# Patient Record
Sex: FEMALE | Race: OTHER | HISPANIC OR LATINO | URBAN - METROPOLITAN AREA
[De-identification: names, ages, dates, MRNs, and addresses within clinical notes are randomized per-mention and may not be internally consistent; named-entity substitution may affect disease eponyms.]

---

## 2022-09-08 ENCOUNTER — EMERGENCY (EMERGENCY)
Facility: HOSPITAL | Age: 77
LOS: 0 days | Discharge: HOME | End: 2022-09-09
Attending: EMERGENCY MEDICINE | Admitting: HOSPITALIST

## 2022-09-08 VITALS
HEART RATE: 96 BPM | RESPIRATION RATE: 20 BRPM | SYSTOLIC BLOOD PRESSURE: 115 MMHG | HEIGHT: 60 IN | WEIGHT: 149.91 LBS | DIASTOLIC BLOOD PRESSURE: 78 MMHG | OXYGEN SATURATION: 99 % | TEMPERATURE: 98 F

## 2022-09-08 DIAGNOSIS — R07.89 OTHER CHEST PAIN: ICD-10-CM

## 2022-09-08 DIAGNOSIS — I95.9 HYPOTENSION, UNSPECIFIED: ICD-10-CM

## 2022-09-08 DIAGNOSIS — I10 ESSENTIAL (PRIMARY) HYPERTENSION: ICD-10-CM

## 2022-09-08 DIAGNOSIS — M19.90 UNSPECIFIED OSTEOARTHRITIS, UNSPECIFIED SITE: ICD-10-CM

## 2022-09-08 DIAGNOSIS — Z20.822 CONTACT WITH AND (SUSPECTED) EXPOSURE TO COVID-19: ICD-10-CM

## 2022-09-08 DIAGNOSIS — E78.5 HYPERLIPIDEMIA, UNSPECIFIED: ICD-10-CM

## 2022-09-08 DIAGNOSIS — E11.9 TYPE 2 DIABETES MELLITUS WITHOUT COMPLICATIONS: ICD-10-CM

## 2022-09-08 LAB
ALBUMIN SERPL ELPH-MCNC: 3.2 G/DL — LOW (ref 3.3–5)
ALP SERPL-CCNC: 59 U/L — SIGNIFICANT CHANGE UP (ref 40–120)
ALT FLD-CCNC: 13 U/L — SIGNIFICANT CHANGE UP (ref 12–78)
ANION GAP SERPL CALC-SCNC: 9 MMOL/L — SIGNIFICANT CHANGE UP (ref 5–17)
APAP SERPL-MCNC: 17 UG/ML — SIGNIFICANT CHANGE UP (ref 10–30)
APTT BLD: 29.5 SEC — SIGNIFICANT CHANGE UP (ref 27.5–35.5)
AST SERPL-CCNC: 19 U/L — SIGNIFICANT CHANGE UP (ref 15–37)
BASOPHILS # BLD AUTO: 0.02 K/UL — SIGNIFICANT CHANGE UP (ref 0–0.2)
BASOPHILS NFR BLD AUTO: 0.3 % — SIGNIFICANT CHANGE UP (ref 0–2)
BILIRUB SERPL-MCNC: 0.3 MG/DL — SIGNIFICANT CHANGE UP (ref 0.2–1.2)
BLD GP AB SCN SERPL QL: SIGNIFICANT CHANGE UP
BUN SERPL-MCNC: 24 MG/DL — HIGH (ref 7–23)
CALCIUM SERPL-MCNC: 7.9 MG/DL — LOW (ref 8.5–10.1)
CHLORIDE SERPL-SCNC: 114 MMOL/L — HIGH (ref 96–108)
CO2 SERPL-SCNC: 18 MMOL/L — LOW (ref 22–31)
CREAT SERPL-MCNC: 1.42 MG/DL — HIGH (ref 0.5–1.3)
EGFR: 38 ML/MIN/1.73M2 — LOW
EOSINOPHIL # BLD AUTO: 0.04 K/UL — SIGNIFICANT CHANGE UP (ref 0–0.5)
EOSINOPHIL NFR BLD AUTO: 0.6 % — SIGNIFICANT CHANGE UP (ref 0–6)
FLUAV AG NPH QL: SIGNIFICANT CHANGE UP
FLUBV AG NPH QL: SIGNIFICANT CHANGE UP
GLUCOSE SERPL-MCNC: 222 MG/DL — HIGH (ref 70–99)
HCT VFR BLD CALC: 33.8 % — LOW (ref 34.5–45)
HGB BLD-MCNC: 10.5 G/DL — LOW (ref 11.5–15.5)
IMM GRANULOCYTES NFR BLD AUTO: 0.1 % — SIGNIFICANT CHANGE UP (ref 0–1.5)
INR BLD: 1.27 RATIO — HIGH (ref 0.88–1.16)
LACTATE SERPL-SCNC: 2 MMOL/L — SIGNIFICANT CHANGE UP (ref 0.7–2)
LYMPHOCYTES # BLD AUTO: 1.5 K/UL — SIGNIFICANT CHANGE UP (ref 1–3.3)
LYMPHOCYTES # BLD AUTO: 22.1 % — SIGNIFICANT CHANGE UP (ref 13–44)
MCHC RBC-ENTMCNC: 28.9 PG — SIGNIFICANT CHANGE UP (ref 27–34)
MCHC RBC-ENTMCNC: 31.1 G/DL — LOW (ref 32–36)
MCV RBC AUTO: 93.1 FL — SIGNIFICANT CHANGE UP (ref 80–100)
MONOCYTES # BLD AUTO: 0.33 K/UL — SIGNIFICANT CHANGE UP (ref 0–0.9)
MONOCYTES NFR BLD AUTO: 4.9 % — SIGNIFICANT CHANGE UP (ref 2–14)
NEUTROPHILS # BLD AUTO: 4.88 K/UL — SIGNIFICANT CHANGE UP (ref 1.8–7.4)
NEUTROPHILS NFR BLD AUTO: 72 % — SIGNIFICANT CHANGE UP (ref 43–77)
NRBC # BLD: 0 /100 WBCS — SIGNIFICANT CHANGE UP (ref 0–0)
PLATELET # BLD AUTO: 250 K/UL — SIGNIFICANT CHANGE UP (ref 150–400)
POTASSIUM SERPL-MCNC: 3.8 MMOL/L — SIGNIFICANT CHANGE UP (ref 3.5–5.3)
POTASSIUM SERPL-SCNC: 3.8 MMOL/L — SIGNIFICANT CHANGE UP (ref 3.5–5.3)
PROT SERPL-MCNC: 6.1 GM/DL — SIGNIFICANT CHANGE UP (ref 6–8.3)
PROTHROM AB SERPL-ACNC: 15.2 SEC — HIGH (ref 10.5–13.4)
RBC # BLD: 3.63 M/UL — LOW (ref 3.8–5.2)
RBC # FLD: 13.7 % — SIGNIFICANT CHANGE UP (ref 10.3–14.5)
SALICYLATES SERPL-MCNC: <1.7 MG/DL — LOW (ref 2.8–20)
SARS-COV-2 RNA SPEC QL NAA+PROBE: SIGNIFICANT CHANGE UP
SODIUM SERPL-SCNC: 141 MMOL/L — SIGNIFICANT CHANGE UP (ref 135–145)
TROPONIN I, HIGH SENSITIVITY RESULT: 14.8 NG/L — SIGNIFICANT CHANGE UP
TROPONIN I, HIGH SENSITIVITY RESULT: 4.6 NG/L — SIGNIFICANT CHANGE UP
WBC # BLD: 6.78 K/UL — SIGNIFICANT CHANGE UP (ref 3.8–10.5)
WBC # FLD AUTO: 6.78 K/UL — SIGNIFICANT CHANGE UP (ref 3.8–10.5)

## 2022-09-08 PROCEDURE — 93010 ELECTROCARDIOGRAM REPORT: CPT

## 2022-09-08 PROCEDURE — 71045 X-RAY EXAM CHEST 1 VIEW: CPT | Mod: 26

## 2022-09-08 PROCEDURE — 70450 CT HEAD/BRAIN W/O DYE: CPT | Mod: 26,MA

## 2022-09-08 PROCEDURE — 71275 CT ANGIOGRAPHY CHEST: CPT | Mod: 26,MA

## 2022-09-08 PROCEDURE — 74174 CTA ABD&PLVS W/CONTRAST: CPT | Mod: 26,MA

## 2022-09-08 PROCEDURE — 99285 EMERGENCY DEPT VISIT HI MDM: CPT

## 2022-09-08 RX ORDER — ONDANSETRON 8 MG/1
4 TABLET, FILM COATED ORAL ONCE
Refills: 0 | Status: COMPLETED | OUTPATIENT
Start: 2022-09-08 | End: 2022-09-08

## 2022-09-08 RX ORDER — EPINEPHRINE 0.3 MG/.3ML
1 INJECTION INTRAMUSCULAR; SUBCUTANEOUS ONCE
Refills: 0 | Status: COMPLETED | OUTPATIENT
Start: 2022-09-08 | End: 2022-09-08

## 2022-09-08 RX ORDER — NOREPINEPHRINE BITARTRATE/D5W 8 MG/250ML
0.05 PLASTIC BAG, INJECTION (ML) INTRAVENOUS
Qty: 8 | Refills: 0 | Status: DISCONTINUED | OUTPATIENT
Start: 2022-09-08 | End: 2022-09-09

## 2022-09-08 RX ORDER — SODIUM CHLORIDE 9 MG/ML
1000 INJECTION INTRAMUSCULAR; INTRAVENOUS; SUBCUTANEOUS ONCE
Refills: 0 | Status: COMPLETED | OUTPATIENT
Start: 2022-09-08 | End: 2022-09-08

## 2022-09-08 RX ORDER — PANTOPRAZOLE SODIUM 20 MG/1
40 TABLET, DELAYED RELEASE ORAL ONCE
Refills: 0 | Status: COMPLETED | OUTPATIENT
Start: 2022-09-08 | End: 2022-09-08

## 2022-09-08 RX ADMIN — Medication 0.05 MICROGRAM(S)/KG/MIN: at 23:41

## 2022-09-08 RX ADMIN — SODIUM CHLORIDE 1000 MILLILITER(S): 9 INJECTION INTRAMUSCULAR; INTRAVENOUS; SUBCUTANEOUS at 22:00

## 2022-09-08 RX ADMIN — SODIUM CHLORIDE 1000 MILLILITER(S): 9 INJECTION INTRAMUSCULAR; INTRAVENOUS; SUBCUTANEOUS at 20:55

## 2022-09-08 RX ADMIN — Medication 6.38 MICROGRAM(S)/KG/MIN: at 21:25

## 2022-09-08 RX ADMIN — PANTOPRAZOLE SODIUM 40 MILLIGRAM(S): 20 TABLET, DELAYED RELEASE ORAL at 22:43

## 2022-09-08 RX ADMIN — ONDANSETRON 4 MILLIGRAM(S): 8 TABLET, FILM COATED ORAL at 22:43

## 2022-09-08 RX ADMIN — SODIUM CHLORIDE 1000 MILLILITER(S): 9 INJECTION INTRAMUSCULAR; INTRAVENOUS; SUBCUTANEOUS at 22:50

## 2022-09-08 RX ADMIN — EPINEPHRINE 1 MILLIGRAM(S): 0.3 INJECTION INTRAMUSCULAR; SUBCUTANEOUS at 21:04

## 2022-09-08 NOTE — ED ADULT NURSE NOTE - OBJECTIVE STATEMENT
Received pt lying on stretcher lethargic and drowsy but responsive to verbal stimuli when name called. Pt diaphoretic with pale skin with MAP of 36. According to family at bedside, pt was home c/o itching, hives, mild urticaria, and mild SOB, EMS called and was given Benadryl en route to ER. IV noted to left hand placed by EMS. #20 IV forearm placed on ER and pt taken immediately to CT. Pt had one episode of emesis in CT, Zofran and Epi administered as ordered. Hx HTN, hyperlipidemia. Received pt lying on stretcher lethargic and drowsy but responsive to verbal stimuli when name called. Pt diaphoretic with pale skin with MAP of 36. According to family at bedside, pt was home c/o itching, hives, mild urticaria, and mild SOB, EMS called and pt was given Benadryl en route to ER. IV noted to left hand placed by EMS. #20 IV forearm placed in ER and pt taken immediately to CT. Pt had one episode of emesis in CT, Zofran and Epi administered as ordered. Hx HTN, hyperlipidemia.

## 2022-09-08 NOTE — ED PROVIDER NOTE - PHYSICAL EXAMINATION
Gen: Alert, marked distress, writhing  Head: NC, AT, EOMI, normal lids/conjunctiva  ENT: normal hearing, moist mucosa  Neck: +supple, no meningismus, JVD, +Trachea midline  Pulm: Bilateral BS, normal resp effort, no wheeze/stridor/retractions  CV: RRR, no M/R/G, +dist pulses  Abd: soft, NT/ND, Negative Fisk signs, +BS, no palpable masses  Mskel: no edema/erythema/cyanosis  Skin: no rash, warm/dry  Neuro: AAOx2, no apparent sensory/motor deficits, coordination intact

## 2022-09-08 NOTE — ED ADULT NURSE NOTE - CHIEF COMPLAINT QUOTE
BIBA,  allergic reaction.  per EMS, pt c/o itching, hives, mild urticaria, mild SOB, started after eating chicken nuggets.   EMS administered Benadryl 50mg IVP at 2025.  no tongue swelling noted, no difficulty swallowing/speaking.  [place] speaking, son in triage

## 2022-09-08 NOTE — ED ADULT TRIAGE NOTE - CHIEF COMPLAINT QUOTE
BIBA,  allergic reaction.  per EMS, pt c/o itching, hives, mild urticaria, mild SOB, started after eating chicken nuggets.   EMS administered Benadryl 50mg IVP at 2025.  no tongue swelling noted, no difficulty swallowing/speaking.  Greenlandic speaking, son in triage

## 2022-09-08 NOTE — ED PROVIDER NOTE - CLINICAL SUMMARY MEDICAL DECISION MAKING FREE TEXT BOX
Patient came in with chest pain going into her back, hives in field treated with benadryl.  VS stabilized, although patient had critical ER course, evaluated for dissection, was very ill appearing at one point with severe hypotension treated with fluids, push dose epi, levophed drip, source unclear as to whether she had severe anaphylaxis, vasovagal event.  Serial EKGs unremarkable, slight uptrend in troponin.  Dissection ruled out.  Patient does not show signs of sepsis.  ICU asked to evaluate, patient doing much better, no longer on pressors, however will need admission for observation, serial EKG/trops.  Patient is to be admitted to the hospital and the case was discussed with the admitting physician.  Any changes in plan, additional imaging/labs, and further work up will be at the discretion of the admitting physician. Per discussion with admitting physician, all necessary consults for admitted patients to be obtained by morning team unless patient requires emergent intervention or medical advice by specialist overnight.

## 2022-09-08 NOTE — ED PROVIDER NOTE - EKG ADDITIONAL QUESTION - PERFORMED INDEPENDENT VISUALIZATION
LM informing patient I spoke with the pharmacy and Trulicity is covered under his formulary, so he should not have a problem picking up med. Advised patient to take activated copay card to his pharmacy.    Yes

## 2022-09-08 NOTE — ED PROVIDER NOTE - OBJECTIVE STATEMENT
Pertinent PMH/PSH/FHx/SHx and Review of Systems contained within:  Patient presents to the ED for chest pain radiating to her back/neck.  Patient triaged as allergic reaction, son at bedside states that patient eats chicken nuggets all the time and has the rash on and off for months, he does not believe she is having an allergic reaction.  Patient is in severe distress, keeps stating pain in chest and back and not able to answer further questions.       Relevant PMHx/SHx/SOCHx/FAMH:  HTN, HLD, DM, arthritis  Patient family denies EtOH/tobacco/illicit substance use. Pertinent PMH/PSH/FHx/SHx and Review of Systems contained within:  Patient presents to the ED for chest pain radiating to her back/neck.  Patient triaged as allergic reaction, son at bedside states that patient eats chicken nuggets all the time and has the rash on and off for months, he does not believe she is having an allergic reaction.  Patient is in severe distress, keeps stating pain in chest and back and not able to answer further questions.   Patient is already declining admission, concern about TIAs d/w patient.     Relevant PMHx/SHx/SOCHx/FAMH:  HTN, HLD, DM, arthritis  Patient family denies EtOH/tobacco/illicit substance use.

## 2022-09-08 NOTE — ED ADULT TRIAGE NOTE - HISTORY OF COVID-19 VACCINATION
Date: 8/27/2021    Time: 1049 AM  Patient Placed On BIPAP/CPAP/ Non-Invasive Ventilation? Yes    If no must comment. Facial area red/color change? No           If YES are Blister/Lesion present? No   If yes must notify nursing staff  BIPAP/CPAP skin barrier?   Yes    Skin barrier type:mepilexlite       Comments:    bipap applied in ER     [facility] Yes

## 2022-09-08 NOTE — ED ADULT TRIAGE NOTE - HAVE YOU HAD A FIRST COVID-19 BOOSTER?
Render Risk Assessment In Note?: no Detail Level: Zone Recommendation Preamble: The following recommendations were made during the visit: Yes

## 2022-09-09 ENCOUNTER — TRANSCRIPTION ENCOUNTER (OUTPATIENT)
Age: 77
End: 2022-09-09

## 2022-09-09 VITALS
TEMPERATURE: 98 F | DIASTOLIC BLOOD PRESSURE: 72 MMHG | RESPIRATION RATE: 17 BRPM | OXYGEN SATURATION: 97 % | SYSTOLIC BLOOD PRESSURE: 115 MMHG | HEART RATE: 75 BPM

## 2022-09-09 DIAGNOSIS — T78.2XXA ANAPHYLACTIC SHOCK, UNSPECIFIED, INITIAL ENCOUNTER: ICD-10-CM

## 2022-09-09 DIAGNOSIS — Z91.89 OTHER SPECIFIED PERSONAL RISK FACTORS, NOT ELSEWHERE CLASSIFIED: ICD-10-CM

## 2022-09-09 DIAGNOSIS — D64.9 ANEMIA, UNSPECIFIED: ICD-10-CM

## 2022-09-09 DIAGNOSIS — N17.9 ACUTE KIDNEY FAILURE, UNSPECIFIED: ICD-10-CM

## 2022-09-09 LAB
A1C WITH ESTIMATED AVERAGE GLUCOSE RESULT: 5.9 % — HIGH (ref 4–5.6)
ALBUMIN SERPL ELPH-MCNC: 3.2 G/DL — LOW (ref 3.3–5)
ALP SERPL-CCNC: 60 U/L — SIGNIFICANT CHANGE UP (ref 40–120)
ALT FLD-CCNC: 14 U/L — SIGNIFICANT CHANGE UP (ref 12–78)
ANION GAP SERPL CALC-SCNC: 5 MMOL/L — SIGNIFICANT CHANGE UP (ref 5–17)
AST SERPL-CCNC: 23 U/L — SIGNIFICANT CHANGE UP (ref 15–37)
BILIRUB SERPL-MCNC: 0.5 MG/DL — SIGNIFICANT CHANGE UP (ref 0.2–1.2)
BUN SERPL-MCNC: 16 MG/DL — SIGNIFICANT CHANGE UP (ref 7–23)
CALCIUM SERPL-MCNC: 8.8 MG/DL — SIGNIFICANT CHANGE UP (ref 8.5–10.1)
CHLORIDE SERPL-SCNC: 114 MMOL/L — HIGH (ref 96–108)
CHOLEST SERPL-MCNC: 190 MG/DL — SIGNIFICANT CHANGE UP
CO2 SERPL-SCNC: 22 MMOL/L — SIGNIFICANT CHANGE UP (ref 22–31)
CREAT SERPL-MCNC: 0.74 MG/DL — SIGNIFICANT CHANGE UP (ref 0.5–1.3)
EGFR: 83 ML/MIN/1.73M2 — SIGNIFICANT CHANGE UP
ESTIMATED AVERAGE GLUCOSE: 123 MG/DL — HIGH (ref 68–114)
FERRITIN SERPL-MCNC: 42 NG/ML — SIGNIFICANT CHANGE UP (ref 15–150)
FOLATE SERPL-MCNC: 9.2 NG/ML — SIGNIFICANT CHANGE UP
GLUCOSE BLDC GLUCOMTR-MCNC: 103 MG/DL — HIGH (ref 70–99)
GLUCOSE BLDC GLUCOMTR-MCNC: 74 MG/DL — SIGNIFICANT CHANGE UP (ref 70–99)
GLUCOSE SERPL-MCNC: 83 MG/DL — SIGNIFICANT CHANGE UP (ref 70–99)
HCT VFR BLD CALC: 34.7 % — SIGNIFICANT CHANGE UP (ref 34.5–45)
HDLC SERPL-MCNC: 70 MG/DL — SIGNIFICANT CHANGE UP
HGB BLD-MCNC: 10.9 G/DL — LOW (ref 11.5–15.5)
IRON SATN MFR SERPL: 16 % — SIGNIFICANT CHANGE UP (ref 14–50)
IRON SATN MFR SERPL: 56 UG/DL — SIGNIFICANT CHANGE UP (ref 30–160)
LIPID PNL WITH DIRECT LDL SERPL: 107 MG/DL — HIGH
MCHC RBC-ENTMCNC: 28.5 PG — SIGNIFICANT CHANGE UP (ref 27–34)
MCHC RBC-ENTMCNC: 31.4 G/DL — LOW (ref 32–36)
MCV RBC AUTO: 90.6 FL — SIGNIFICANT CHANGE UP (ref 80–100)
NON HDL CHOLESTEROL: 120 MG/DL — SIGNIFICANT CHANGE UP
NRBC # BLD: 0 /100 WBCS — SIGNIFICANT CHANGE UP (ref 0–0)
PLATELET # BLD AUTO: 228 K/UL — SIGNIFICANT CHANGE UP (ref 150–400)
POTASSIUM SERPL-MCNC: 3.9 MMOL/L — SIGNIFICANT CHANGE UP (ref 3.5–5.3)
POTASSIUM SERPL-SCNC: 3.9 MMOL/L — SIGNIFICANT CHANGE UP (ref 3.5–5.3)
PROT SERPL-MCNC: 6.3 GM/DL — SIGNIFICANT CHANGE UP (ref 6–8.3)
RBC # BLD: 3.82 M/UL — SIGNIFICANT CHANGE UP (ref 3.8–5.2)
RBC # BLD: 3.83 M/UL — SIGNIFICANT CHANGE UP (ref 3.8–5.2)
RBC # FLD: 14 % — SIGNIFICANT CHANGE UP (ref 10.3–14.5)
RETICS #: 46.6 K/UL — SIGNIFICANT CHANGE UP (ref 25–125)
RETICS/RBC NFR: 1.2 % — SIGNIFICANT CHANGE UP (ref 0.5–2.5)
SODIUM SERPL-SCNC: 141 MMOL/L — SIGNIFICANT CHANGE UP (ref 135–145)
TIBC SERPL-MCNC: 349 UG/DL — SIGNIFICANT CHANGE UP (ref 220–430)
TRIGL SERPL-MCNC: 63 MG/DL — SIGNIFICANT CHANGE UP
UIBC SERPL-MCNC: 292 UG/DL — SIGNIFICANT CHANGE UP (ref 110–370)
VIT B12 SERPL-MCNC: 464 PG/ML — SIGNIFICANT CHANGE UP (ref 232–1245)
WBC # BLD: 8.36 K/UL — SIGNIFICANT CHANGE UP (ref 3.8–10.5)
WBC # FLD AUTO: 8.36 K/UL — SIGNIFICANT CHANGE UP (ref 3.8–10.5)

## 2022-09-09 PROCEDURE — 99223 1ST HOSP IP/OBS HIGH 75: CPT | Mod: FS,GC

## 2022-09-09 PROCEDURE — 99223 1ST HOSP IP/OBS HIGH 75: CPT

## 2022-09-09 RX ORDER — SODIUM CHLORIDE 9 MG/ML
1000 INJECTION, SOLUTION INTRAVENOUS
Refills: 0 | Status: DISCONTINUED | OUTPATIENT
Start: 2022-09-09 | End: 2022-09-09

## 2022-09-09 RX ORDER — DEXTROSE 50 % IN WATER 50 %
12.5 SYRINGE (ML) INTRAVENOUS ONCE
Refills: 0 | Status: DISCONTINUED | OUTPATIENT
Start: 2022-09-09 | End: 2022-09-09

## 2022-09-09 RX ORDER — ACETAMINOPHEN 500 MG
650 TABLET ORAL EVERY 6 HOURS
Refills: 0 | Status: DISCONTINUED | OUTPATIENT
Start: 2022-09-09 | End: 2022-09-09

## 2022-09-09 RX ORDER — ATORVASTATIN CALCIUM 80 MG/1
1 TABLET, FILM COATED ORAL
Qty: 30 | Refills: 0
Start: 2022-09-09 | End: 2022-10-08

## 2022-09-09 RX ORDER — LANOLIN ALCOHOL/MO/W.PET/CERES
3 CREAM (GRAM) TOPICAL AT BEDTIME
Refills: 0 | Status: DISCONTINUED | OUTPATIENT
Start: 2022-09-09 | End: 2022-09-09

## 2022-09-09 RX ORDER — INSULIN LISPRO 100/ML
VIAL (ML) SUBCUTANEOUS
Refills: 0 | Status: DISCONTINUED | OUTPATIENT
Start: 2022-09-09 | End: 2022-09-09

## 2022-09-09 RX ORDER — GLUCAGON INJECTION, SOLUTION 0.5 MG/.1ML
1 INJECTION, SOLUTION SUBCUTANEOUS ONCE
Refills: 0 | Status: DISCONTINUED | OUTPATIENT
Start: 2022-09-09 | End: 2022-09-09

## 2022-09-09 RX ORDER — DEXTROSE 50 % IN WATER 50 %
25 SYRINGE (ML) INTRAVENOUS ONCE
Refills: 0 | Status: DISCONTINUED | OUTPATIENT
Start: 2022-09-09 | End: 2022-09-09

## 2022-09-09 RX ORDER — DEXTROSE 50 % IN WATER 50 %
15 SYRINGE (ML) INTRAVENOUS ONCE
Refills: 0 | Status: DISCONTINUED | OUTPATIENT
Start: 2022-09-09 | End: 2022-09-09

## 2022-09-09 RX ORDER — ATORVASTATIN CALCIUM 80 MG/1
40 TABLET, FILM COATED ORAL AT BEDTIME
Refills: 0 | Status: DISCONTINUED | OUTPATIENT
Start: 2022-09-09 | End: 2022-09-09

## 2022-09-09 RX ADMIN — Medication 650 MILLIGRAM(S): at 12:35

## 2022-09-09 RX ADMIN — SODIUM CHLORIDE 75 MILLILITER(S): 9 INJECTION, SOLUTION INTRAVENOUS at 04:31

## 2022-09-09 RX ADMIN — ATORVASTATIN CALCIUM 40 MILLIGRAM(S): 80 TABLET, FILM COATED ORAL at 03:03

## 2022-09-09 RX ADMIN — Medication 650 MILLIGRAM(S): at 11:54

## 2022-09-09 RX ADMIN — SODIUM CHLORIDE 1000 MILLILITER(S): 9 INJECTION INTRAMUSCULAR; INTRAVENOUS; SUBCUTANEOUS at 01:02

## 2022-09-09 NOTE — PATIENT PROFILE ADULT - FALL HARM RISK - HARM RISK INTERVENTIONS
Assistance with ambulation/Assistance OOB with selected safe patient handling equipment/Communicate Risk of Fall with Harm to all staff/Discuss with provider need for PT consult/Monitor gait and stability/Reinforce activity limits and safety measures with patient and family/Tailored Fall Risk Interventions/Visual Cue: Yellow wristband and red socks/Bed in lowest position, wheels locked, appropriate side rails in place/Call bell, personal items and telephone in reach/Instruct patient to call for assistance before getting out of bed or chair/Non-slip footwear when patient is out of bed/[place] to call system/Physically safe environment - no spills, clutter or unnecessary equipment/Purposeful Proactive Rounding/Room/bathroom lighting operational, light cord in reach

## 2022-09-09 NOTE — CONSULT NOTE ADULT - ASSESSMENT
Full note in progress        Plan:   Patient weaned off levophed drip. Afterwards, BP with MAP 80.   Patient doesn't require ICU level of care.   Recommend admission for observation.   ED physician informed of the above.   Family updated at the bedside and all questions answered.  76 year old woman with a PMHx of DM, HTN, HLD and prior allergic reactions (unclear trigger).   ICU consulted for hypotension. Patient was started on levophed drip. This was stopped when ICU arrived to evaluate the patient. BP at that time was 96/57 (70). Off Levophed MAP have been ranging between 77 to 81. Last BP reading was 119/59 (80). HR: 84, RR: 18, 02 sat 99 %. Bedside POCUS done showed grossly good LV function and IVC flat.     Unclear exactly what the cause the patient to become hypotensive. Probably the patient had an episode of vagal response during vomiting. She is now improved completely asymptomatic and reports significant improvement of condition. No signs of allergic reaction at this time. Patient maintaining adequate MAP off levophed drip.     Plan:   Can discontinue the levophed drip order as pt no longer requires it.   Patient doesn't require ICU level of care.   Recommend admission for observation.   ED physician informed of the above.   Family updated at the bedside and all questions answered.

## 2022-09-09 NOTE — PATIENT PROFILE ADULT - NSPROIMPLANTSMEDDEV_GEN_A_NUR
[facility]  [address] [facility] [place] [address]  Phone: [phone]  Fax: [phone]    May 14, 2019       Patient: Mauricio Gillette   MR Number: [id number]   YOB: 1969   Date of Visit: 5/14/2019       Kate Greene was seen for a follow up visit today. Here is my assessment and plan as well as an attached copy of her visit today:    Obstructive sleep apnea (adult) (pediatric)  New Problem - On Tx. Reviewed sleep study (copy given to pt for their records) and download compliance data with patient. Supplies and parts as needed for her machine. These are medically necessary. Limit caffeine use after 3pm.  Will show how to adjust humidity. Needs to use her machine each and every night. 3 month f/u. Chronic combined systolic and diastolic CHF (congestive heart failure) (HCC)  Chronic- Stable. Cont meds per PCP and other physicians. Essential hypertension  Chronic- Stable. Cont meds per PCP and other physicians. Diabetes mellitus (HCC)  Chronic- Stable. Cont meds per PCP and other physicians. Morbid obesity with BMI of 70 and over, adult ([facility] [address]  Chronic-Stable. Encouraged her to work on weight loss through diet and exercise. If you have questions or concerns, please do not hesitate to call me. I look forward to following Malena along with you.     Sincerely,    Connie Soler MD    CC providers:  Cally Olson, APRN - CNS  [address]  [facility] [address] None

## 2022-09-09 NOTE — DISCHARGE NOTE PROVIDER - HOSPITAL COURSE
75 yo F w/PMHx of DM, HTN, HLD presents with SOB, chest pain, anxiety.     #S/p anaphylactic shock  - Pt hypotensive w/sob and feeling of impeding doom after itchy rash  - Aborted by epi and cristofer  - ICU evaluated and denied need for ICU but recommended admission for observation  - CT neg for aortic dissection    #BREE  - Cr of 1.42  - Gentle hydration, LR@75  - Avoid nephrotoxic meds    #Anemia  - F/u anemia workup  - Hgb of 10.5, MCV normal    #HTN  - Unsure of home meds.  - Hold antihypertensives in setting of recent shock    #HLD  - Atorvastatin 40mg qD    #DM  - Unsure of home meds  - Low SSI  - F/u POCT, A1c    Code: Full  VTE: ICPs

## 2022-09-09 NOTE — DISCHARGE NOTE PROVIDER - NSDCCPCAREPLAN_GEN_ALL_CORE_FT
PRINCIPAL DISCHARGE DIAGNOSIS  Diagnosis: Anaphylactic shock, initial encounter  Assessment and Plan of Treatment: you were diagnosed with an allergic reaction  your symptoms resolved  we recommend following up with your PCP in one week      SECONDARY DISCHARGE DIAGNOSES  Diagnosis: Hypotension  Assessment and Plan of Treatment:

## 2022-09-09 NOTE — CONSULT NOTE ADULT - NS ATTEND AMEND GEN_ALL_CORE FT
76 year old woman with a PMHx of DM, HTN, HLD and prior allergic reactions (unclear trigger).   ICU consulted for hypotension. Patient was started on levophed drip. This was stopped when ICU arrived to evaluate the patient. BP at that time was 96/57 (70). Off Levophed MAP have been ranging between 77 to 81. Last BP reading was 119/59 (80). HR: 84, RR: 18, 02 sat 99 %. Bedside POCUS done showed grossly good LV function and IVC flat.     She has normal cardiac function, ekg, trops, and a CTA aorta that ruled out cardiovascular catastrophies, she has no fever or white count.    leaving 2 most likely candidates for her presentation as anaphylaxis and vagal response to pain and vomiting, she did get epi push.    Given some degree of uncertnatnty, evn though she has been weaned off pressors and has no concerning signs at this minute I would admit for observation overnight.  The sons did express a desire to go home but I strongly encourage overnight stay as if this was anphylaxis from food it may just rebound after epi and should be observed longer.

## 2022-09-09 NOTE — PATIENT PROFILE ADULT - FALL HARM RISK - FALL HARM RISK
Pre-op faxed to Dr. Sloan as requested by patient. Patient notified that this has been done.    Other

## 2022-09-09 NOTE — DISCHARGE NOTE NURSING/CASE MANAGEMENT/SOCIAL WORK - NSDCPEFALRISK_GEN_ALL_CORE
For information on Fall & Injury Prevention, visit: [email or web address] OR  [email or web address] OR  https://www.cdc.gov/steadi/patient.html

## 2022-09-09 NOTE — H&P ADULT - NSHPLABSRESULTS_GEN_ALL_CORE
10.5   6.78  )-----------( 250      ( 08 Sep 2022 21:14 )             33.8       09-08    141  |  114<H>  |  24<H>  ----------------------------<  222<H>  3.8   |  18<L>  |  1.42<H>    Ca    7.9<L>      08 Sep 2022 21:14    TPro  6.1  /  Alb  3.2<L>  /  TBili  0.3  /  DBili  x   /  AST  19  /  ALT  13  /  AlkPhos  59  09-08              PT/INR - ( 08 Sep 2022 21:14 )   PT: 15.2 sec;   INR: 1.27 ratio         PTT - ( 08 Sep 2022 21:14 )  PTT:29.5 sec

## 2022-09-09 NOTE — H&P ADULT - ASSESSMENT
75 yo F w/PMHx of DM, HTN, HLD presents with SOB, chest pain, anxiety.     #S/p anaphylactic shock  - Pt hypotensive w/sob and feeling of impeding doom after itchy rash  - Aborted by epi and cristofer  - ICU evaluated and denied need for ICU but recommended admission for observation  - CT neg for aortic dissection    #BREE  - Cr of 1.42  - Gentle hydration, LR@75  - Avoid nephrotoxic meds    #Anemia  - F/u anemia workup  - Hgb of 10.5, MCV normal    #HTN  - Unsure of home meds.  - Hold antihypertensives in setting of recent shock    #HLD  - Atorvastatin 40mg qD    #DM  - Unsure of home meds  - Low SSI  - F/u POCT, A1c    Code: Full  VTE: ICPs  Diet: Regular

## 2022-09-09 NOTE — DISCHARGE NOTE NURSING/CASE MANAGEMENT/SOCIAL WORK - PATIENT PORTAL LINK FT
You can access the FollowMyHealth Patient Portal offered by [facility] by registering at the following website: [email or web address]. By joining [employer]’s FollowMyHealth portal, you will also be able to view your health information using other applications (apps) compatible with our system.

## 2022-09-09 NOTE — H&P ADULT - NSHPPHYSICALEXAM_GEN_ALL_CORE
T(C): 36.6 (09-08-22 @ 23:18), Max: 36.6 (09-08-22 @ 20:32)  HR: 81 (09-09-22 @ 02:06) (77 - 96)  BP: 127/68 (09-09-22 @ 02:06) (79/38 - 131/62)  RR: 14 (09-09-22 @ 02:06) (14 - 29)  SpO2: 96% (09-09-22 @ 02:06) (94% - 99%)    CONSTITUTIONAL: Well groomed, no apparent distress  EYES: PERRLA and symmetric, EOMI, No conjunctival or scleral injection, non-icteric  ENMT: Oral mucosa with moist membranes. Normal dentition; no pharyngeal injection or exudates             NECK: Supple, symmetric and without tracheal deviation   RESP: No respiratory distress, no use of accessory muscles; CTA b/l, no WRR  CV: RRR, +S1S2, no MRG; no JVD; no peripheral edema  GI: Soft, NT, ND, no rebound, no guarding  LYMPH: No cervical LAD or tenderness; no axillary LAD or tenderness; no inguinal LAD or tenderness  MSK: Normal ROM without pain, no spinal tenderness, normal muscle strength/tone  SKIN: No rashes or ulcers noted; no subcutaneous nodules or induration palpable  NEURO: CN II-XII intact; sensation intact in upper and lower extremities b/l to light touch   PSYCH: Appropriate insight/judgment; A+O x 3, mood and affect appropriate, recent/remote memory intact

## 2022-09-09 NOTE — CONSULT NOTE ADULT - SUBJECTIVE AND OBJECTIVE BOX
Patient Haitian speaking, and requested her sons translate for her.     76 year old woman with a PMHx of DM, HTN, HLD. She presents with lightheadedness, fatigue, shortness of breath and itching to b/l upper extremities. Patient reports that earlier today, she ate chicken nuggets from [place]new location) and then began feeling itchy all over. She said starting in her hands and wrists and spreading from there as a rash. Her condition continued to worsen leading to her visit to the ER. She became increasingly short of breath, developed chest pain radiating to her back and felt a sense of impending doom. Patient got a dose of epinephrine. While in ED, pt also became bradycardic and hypotensive with concerns of possible aortic aneurysm/dissection. Vascular Studies were neg. Pt started on norepinehrine  for hypotension and ICU was consulted.     Patient seen and examined with ICU attending.  Now she states she is completely asymptomatic and reports significant improvement of condition. Pt reports having 4 separate allergic reactions with rash and itching over the past year but never to this extent. She moved here from [place] 3 months ago.  Patient Chinese speaking, and requested her sons translate for her.     76 year old woman with a PMHx of DM, HTN, HLD and prior allergic reactions (unclear trigger). She moved here from [place] 3 months ago. She presents with lightheadedness, fatigue, shortness of breath and itching to b/l upper extremities. Patient reports that earlier today, she ate chicken nuggets from [employer] (new location) and then began feeling itchy all over. She said starting in her hands and wrists and spreading from there as a rash. Her condition continued to worsen leading to her visit to the ER. She became increasingly short of breath, developed chest pain radiating to her back and felt a sense of impending doom. Patient got a dose of epinephrine. While in ED, pt also vomited and became bradycardic and hypotensive with concerns of possible aortic aneurysm/dissection. Vascular Studies were neg. Pt started on norepinephrine for hypotension and ICU was consulted.   Patient seen and examined with ICU attending.  Now she states she is completely asymptomatic and reports significant improvement of condition. No more itching or hives.       Exam:   Neuro: alert and oriented x 4  Lungs: clear to ascultation, bilateral air entry. no rales or wheezing  Heart: S1, S2, no murmur heard  Abdomen: soft, NT, ND, bowel sounds present.   : no leblanc  Ext: no edema, pulses present b/l  Skin: no rash or hives noted.     Lab Results:    CBC Full  -  ( 08 Sep 2022 21:14 )  WBC Count : 6.78 K/uL  RBC Count : 3.63 M/uL  Hemoglobin : 10.5 g/dL  Hematocrit : 33.8 %  Platelet Count - Automated : 250 K/uL  Mean Cell Volume : 93.1 fl  Mean Cell Hemoglobin : 28.9 pg  Mean Cell Hemoglobin Concentration : 31.1 g/dL  Auto Neutrophil # : 4.88 K/uL  Auto Lymphocyte # : 1.50 K/uL  Auto Monocyte # : 0.33 K/uL  Auto Eosinophil # : 0.04 K/uL  Auto Basophil # : 0.02 K/uL  Auto Neutrophil % : 72.0 %  Auto Lymphocyte % : 22.1 %  Auto Monocyte % : 4.9 %  Auto Eosinophil % : 0.6 %  Auto Basophil % : 0.3 %      Chemistry                        10.5   6.78  )-----------( 250      ( 08 Sep 2022 21:14 )             33.8     09-08    141  |  114<H>  |  24<H>  ----------------------------<  222<H>  3.8   |  18<L>  |  1.42<H>    Ca    7.9<L>      08 Sep 2022 21:14    TPro  6.1  /  Alb  3.2<L>  /  TBili  0.3  /  DBili  x   /  AST  19  /  ALT  13  /  AlkPhos  59  09-08    LIVER FUNCTIONS - ( 08 Sep 2022 21:14 )  Alb: 3.2 g/dL / Pro: 6.1 gm/dL / ALK PHOS: 59 U/L / ALT: 13 U/L / AST: 19 U/L / GGT: x           PT/INR - ( 08 Sep 2022 21:14 )   PT: 15.2 sec;   INR: 1.27 ratio    PTT - ( 08 Sep 2022 21:14 )  PTT:29.5 sec    Lactate, Blood: 2.0 mmol/L (09-08-22 @ 21:55)        RADIOLOGY RESULTS:    CT angio: IMPRESSION: Study is negative for acute aortic syndrome. Dense coronary artery calcification. No acute CT abnormality.

## 2022-09-09 NOTE — ED ADULT NURSE REASSESSMENT NOTE - NS ED NURSE REASSESS COMMENT FT1
Assuming patient's care for coverage. Report received from OLIVER Fields.
Granddaughter Mala phone number is [phone]
Received report from covering RN, v/s bethel, NAD noted, Dr. Lynn at bedside.
Pt back from CT with RNx2 and ER tech, pt AOx4, answering questions with clear speech without difficulty, skin color remains pale. BP and MAP remains low, Levophed to be initiated. Pt denies pain and states she feels much better after vomiting in CT. Safety measures maintained.

## 2022-09-09 NOTE — H&P ADULT - HISTORY OF PRESENT ILLNESS
77 yo F w/PMHx of DM, HTN, HLD presents with SOB, chest pain, anxiety. Patient reports that earlier today, she ate chicken nuggets from [employer] and then began feeling itchy all over, starting in her hands and wrists and spreading from there as a rash. Her condition continued to worsen leading to her visit to the ER. She became increasingly short of breath, developed chest pain radiating to her back and felt a sense of impending doom. While in ED, pt also became bradycardic and hypotensive with concerns of possible aortic aneurysm/dissection. Vascular Studies were neg. Pt received norepi for hypotension and was evaluated by the ICU team but not intubated.     Upon re-exam on admission patient is completely asymptomatic and reports significant improvement of condition. Pt reports having 4 separate allergic reactions with rash and itching over the past year but never to this extent. She moved here from [place] 3 months ago.  Discussed possibility of anaphylaxis with patient and urged importance of follow up with an allergist for further investigation upon discharge.    Hx obtained from patient and granddaughter at bedside.

## 2023-07-17 PROBLEM — E78.5 HYPERLIPIDEMIA, UNSPECIFIED: Chronic | Status: ACTIVE | Noted: 2022-09-09

## 2023-07-17 PROBLEM — I10 ESSENTIAL (PRIMARY) HYPERTENSION: Chronic | Status: ACTIVE | Noted: 2022-09-09

## 2023-07-17 PROBLEM — E11.9 TYPE 2 DIABETES MELLITUS WITHOUT COMPLICATIONS: Chronic | Status: ACTIVE | Noted: 2022-09-09

## 2023-08-10 PROBLEM — Z00.00 ENCOUNTER FOR PREVENTIVE HEALTH EXAMINATION: Status: ACTIVE | Noted: 2023-08-10

## 2023-08-11 ENCOUNTER — APPOINTMENT (OUTPATIENT)
Dept: UROLOGY | Facility: CLINIC | Age: 78
End: 2023-08-11
Payer: MEDICARE

## 2023-08-11 DIAGNOSIS — Z78.9 OTHER SPECIFIED HEALTH STATUS: ICD-10-CM

## 2023-08-11 DIAGNOSIS — Z86.79 PERSONAL HISTORY OF OTHER DISEASES OF THE CIRCULATORY SYSTEM: ICD-10-CM

## 2023-08-11 DIAGNOSIS — R39.89 OTHER SYMPTOMS AND SIGNS INVOLVING THE GENITOURINARY SYSTEM: ICD-10-CM

## 2023-08-11 DIAGNOSIS — Z63.5 DISRUPTION OF FAMILY BY SEPARATION AND DIVORCE: ICD-10-CM

## 2023-08-11 DIAGNOSIS — R39.198 OTHER DIFFICULTIES WITH MICTURITION: ICD-10-CM

## 2023-08-11 DIAGNOSIS — E78.5 HYPERLIPIDEMIA, UNSPECIFIED: ICD-10-CM

## 2023-08-11 PROCEDURE — 51701 INSERT BLADDER CATHETER: CPT

## 2023-08-11 PROCEDURE — 99204 OFFICE O/P NEW MOD 45 MIN: CPT | Mod: 25

## 2023-08-11 PROCEDURE — ZZZZZ: CPT

## 2023-08-11 SDOH — SOCIAL STABILITY - SOCIAL INSECURITY: DISRUPTION OF FAMILY BY SEPARATION AND DIVORCE: Z63.5

## 2023-08-11 NOTE — ASSESSMENT
[FreeTextEntry1] : patient here with son  reports one year hx of voiding dysfunctin  with slow flow and 'drops only' for the past 3 months  no hematuria , no dysuria , dec water intake   ( vag) and hx of BTL  no bowel c/o  denies pelvic surgery or trauma here for further eval : 1- check SC urine 2- SHASHA and pelvic sono  3- UDS for eval

## 2023-08-11 NOTE — HISTORY OF PRESENT ILLNESS
[FreeTextEntry1] : patient here with son  reports one year hx of voiding dysfunctin  with slow flow and 'drops only' for the past 3 months  no hematuria , no dysuria , dec water intake   ( vag) and hx of BTL  no bowel c/o  denies pelvic surgery or trauma here for further eval :

## 2023-08-11 NOTE — PHYSICAL EXAM
[General Appearance - Well Developed] : well developed [General Appearance - Well Nourished] : well nourished [Normal Appearance] : normal appearance [Well Groomed] : well groomed [General Appearance - In No Acute Distress] : no acute distress [Edema] : no peripheral edema [Respiration, Rhythm And Depth] : normal respiratory rhythm and effort [Exaggerated Use Of Accessory Muscles For Inspiration] : no accessory muscle use [Urethral Meatus] : normal urethra [External Female Genitalia] : normal external genitalia [FreeTextEntry1] : soft uretha, uretha and bladder not tender , no d/c , no masses , no stool felt vag in rectal vault  SC after sterile prep with 15 f cath andurine collected and cath removed itntact- greg procedure [Normal Station and Gait] : the gait and station were normal for the patient's age [] : no rash [No Focal Deficits] : no focal deficits [Oriented To Time, Place, And Person] : oriented to person, place, and time [Affect] : the affect was normal [Mood] : the mood was normal [Not Anxious] : not anxious [No Palpable Adenopathy] : no palpable adenopathy

## 2023-08-14 LAB
APPEARANCE: CLEAR
BACTERIA UR CULT: NORMAL
BACTERIA: NEGATIVE /HPF
BILIRUBIN URINE: NEGATIVE
BLOOD URINE: NEGATIVE
CAST: 0 /LPF
COLOR: YELLOW
EPITHELIAL CELLS: 0 /HPF
GLUCOSE QUALITATIVE U: NEGATIVE MG/DL
KETONES URINE: NEGATIVE MG/DL
LEUKOCYTE ESTERASE URINE: NEGATIVE
MICROSCOPIC-UA: NORMAL
NITRITE URINE: NEGATIVE
PH URINE: 6.5
PROTEIN URINE: NEGATIVE MG/DL
RED BLOOD CELLS URINE: 1 /HPF
REVIEW: NORMAL
SPECIFIC GRAVITY URINE: 1.02
UROBILINOGEN URINE: 0.2 MG/DL
WHITE BLOOD CELLS URINE: 0 /HPF

## 2023-09-22 ENCOUNTER — APPOINTMENT (OUTPATIENT)
Dept: UROLOGY | Facility: CLINIC | Age: 78
End: 2023-09-22